# Patient Record
Sex: FEMALE | Race: AMERICAN INDIAN OR ALASKA NATIVE
[De-identification: names, ages, dates, MRNs, and addresses within clinical notes are randomized per-mention and may not be internally consistent; named-entity substitution may affect disease eponyms.]

---

## 2021-10-22 NOTE — EMERGENCY DEPARTMENT REPORT
HPI





- General


Chief Complaint: Allergic Reaction


Time Seen by Provider: 10/22/21 07:50





ED Past Medical Hx





- Past Medical History


Previous Medical History?: Yes


Hx Hypertension: Yes (2010)


Hx Seizures: Yes (LAST 3 YRS AGO;STOPPED TAKING DILANTIN)


Hx Asthma: Yes (has not used albuterol in 3 months)


Hx COPD: Yes


Hx HIV: Yes (1996)


Additional medical history: herniated disc x 3





- Surgical History


Past Surgical History?: Yes


Hx Breast Surgery: Yes (breast reduction)


Additional Surgical History: hysterectomy.  tonsilectomy





- Social History


Smoking Status: Former Smoker


Substance Use Type: None





- Medications


Home Medications: 


                                Home Medications











 Medication  Instructions  Recorded  Confirmed  Last Taken  Type


 


Amlodipine Besylate/Benazepril 10 mg PO BID 01/07/14 06/09/14 06/09/14 08:00 

History





[Lotrel 10-20 mg]     


 


Didanosine 250 mg PO DAILY 01/07/14 06/09/14 06/08/14 11:00 History


 


Efavirenz/Emtricit/Tenofovr Df 1 tab PO DAILY 01/07/14 06/09/14 06/08/14 11:00 

History





[Atripla Tablet]     


 


atenoloL [Tenormin] 50 mg PO DAILY 01/07/14 06/09/14 06/09/14 08:00 History


 


Albuterol Mdi (or & Nicu Only) 1 puff IH PRN PRN 06/09/14 06/09/14 04/01/14 

History





[ProAir HFA Inhaler]     


 


Ibuprofen [Motrin] 800 mg PO PRN PRN 06/09/14 06/09/14 05/20/14 History


 


Ciprofloxacin HCl [Ciprofloxacin 500 mg PO Q12HR #14 tab 06/25/20  Unknown Rx





TAB]     


 


Ondansetron [Zofran Odt] 4 mg PO Q8HR PRN #12 tab.rapdis 06/25/20  Unknown Rx














ED Review of Systems


ROS: 


Stated complaint: REACTION TO PHIZER BOOSTER SHOT


Other details as noted in HPI








Physical Exam





- Physical Exam


Vital Signs: 


                                   Vital Signs











  10/22/21





  07:33


 


Temperature 97.9 F


 


Pulse Rate 93 H


 


Respiratory 18





Rate 


 


Blood Pressure 173/115


 


O2 Sat by Pulse 96





Oximetry 














ED Course


                                   Vital Signs











  10/22/21





  07:33


 


Temperature 97.9 F


 


Pulse Rate 93 H


 


Respiratory 18





Rate 


 


Blood Pressure 173/115


 


O2 Sat by Pulse 96





Oximetry 











Critical care attestation.: 


If time is entered above; I have spent that time in minutes in the direct care 

of this critically ill patient, excluding procedure time.








ED Disposition


Condition: Stable

## 2021-10-22 NOTE — EMERGENCY DEPARTMENT REPORT
ED General Adult HPI





- General


Chief complaint: Allergic Reaction


Stated complaint: REACTION TO PHIZER BOOSTER SHOT


Time Seen by Provider: 10/22/21 07:50


Source: patient


Mode of arrival: Ambulatory


Limitations: No Limitations





- History of Present Illness


Initial comments: 





51-year-old -American female patient presents with complaints of rash and

itchiness to bilateral legs and arms x3 days.  Patient states her symptoms began

the day after receiving the Pfizer booster shot for COVID-19.  She denies any 

chest pain, shortness of breath, dysphagia, or other symptoms.  She reports she 

tried using Benadryl this morning that seemed to help a little with the itching.

 Past medical history includes hypertension and HIV.  Patient states she is 

compliant on ART and that she is undetectable.





- Related Data


                                Home Medications











 Medication  Instructions  Recorded  Confirmed  Last Taken


 


Amlodipine Besylate/Benazepril 10 mg PO BID 01/07/14 06/09/14 06/09/14 08:00





[Lotrel 10-20 mg]    


 


Didanosine 250 mg PO DAILY 01/07/14 06/09/14 06/08/14 11:00


 


Efavirenz/Emtricit/Tenofovr Df 1 tab PO DAILY 01/07/14 06/09/14 06/08/14 11:00





[Atripla Tablet]    


 


atenoloL [Tenormin] 50 mg PO DAILY 01/07/14 06/09/14 06/09/14 08:00


 


Albuterol Mdi (or & Nicu Only) 1 puff IH PRN PRN 06/09/14 06/09/14 04/01/14





[ProAir HFA Inhaler]    


 


Ibuprofen [Motrin] 800 mg PO PRN PRN 06/09/14 06/09/14 05/20/14








                                  Previous Rx's











 Medication  Instructions  Recorded  Last Taken  Type


 


Ciprofloxacin HCl [Ciprofloxacin 500 mg PO Q12HR #14 tab 06/25/20 Unknown Rx





TAB]    


 


Ondansetron [Zofran Odt] 4 mg PO Q8HR PRN #12 tab.rapdis 06/25/20 Unknown Rx


 


Famotidine [Pepcid] 20 mg PO BID 7 Days #14 tablet 10/22/21 Unknown Rx


 


Loratadine 10 mg PO QDAY #7 tablet 10/22/21 Unknown Rx


 


Triamcinolone Acetonide 430 gm TP TID PRN #1 oint...g. 10/22/21 Unknown Rx


 


predniSONE [Deltasone] 20 mg PO BID 6 Days #3 tab 10/22/21 Unknown Rx











                                    Allergies











Allergy/AdvReac Type Severity Reaction Status Date / Time


 


No Known Allergies Allergy   Verified 10/26/14 17:26














ED Review of Systems


ROS: 


Stated complaint: REACTION TO PHIZER BOOSTER SHOT


Other details as noted in HPI





Constitutional: denies: chills, fever, malaise


ENT: denies: throat pain


Respiratory: denies: cough, shortness of breath


Cardiovascular: denies: chest pain


Gastrointestinal: denies: nausea, vomiting


Skin: denies: change in color


Neurological: denies: headache, numbness, paresthesias





ED Past Medical Hx





- Past Medical History


Previous Medical History?: Yes


Hx Hypertension: Yes (2010)


Hx Seizures: Yes (LAST 3 YRS AGO;STOPPED TAKING DILANTIN)


Hx Asthma: Yes (has not used albuterol in 3 months)


Hx COPD: Yes


Hx HIV: Yes (1996)


Additional medical history: herniated disc x 3





- Surgical History


Past Surgical History?: Yes


Hx Breast Surgery: Yes (breast reduction)


Additional Surgical History: hysterectomy.  tonsilectomy





- Social History


Smoking Status: Former Smoker


Substance Use Type: None





- Medications


Home Medications: 


                                Home Medications











 Medication  Instructions  Recorded  Confirmed  Last Taken  Type


 


Amlodipine Besylate/Benazepril 10 mg PO BID 01/07/14 06/09/14 06/09/14 08:00 

History





[Lotrel 10-20 mg]     


 


Didanosine 250 mg PO DAILY 01/07/14 06/09/14 06/08/14 11:00 History


 


Efavirenz/Emtricit/Tenofovr Df 1 tab PO DAILY 01/07/14 06/09/14 06/08/14 11:00 

History





[Atripla Tablet]     


 


atenoloL [Tenormin] 50 mg PO DAILY 01/07/14 06/09/14 06/09/14 08:00 History


 


Albuterol Mdi (or & Nicu Only) 1 puff IH PRN PRN 06/09/14 06/09/14 04/01/14 

History





[ProAir HFA Inhaler]     


 


Ibuprofen [Motrin] 800 mg PO PRN PRN 06/09/14 06/09/14 05/20/14 History


 


Ciprofloxacin HCl [Ciprofloxacin 500 mg PO Q12HR #14 tab 06/25/20  Unknown Rx





TAB]     


 


Ondansetron [Zofran Odt] 4 mg PO Q8HR PRN #12 tab.rapdis 06/25/20  Unknown Rx


 


Famotidine [Pepcid] 20 mg PO BID 7 Days #14 tablet 10/22/21  Unknown Rx


 


Loratadine 10 mg PO QDAY #7 tablet 10/22/21  Unknown Rx


 


Triamcinolone Acetonide 430 gm TP TID PRN #1 oint...g. 10/22/21  Unknown Rx


 


predniSONE [Deltasone] 20 mg PO BID 6 Days #3 tab 10/22/21  Unknown Rx














ED Physical Exam





- General


Limitations: No Limitations


General appearance: alert, in no apparent distress, obese (Morbid)





- Head


Head exam: Present: atraumatic, normocephalic





- Eye


Eye exam: Present: normal appearance





- ENT


ENT exam: Present: normal exam





- Respiratory


Respiratory exam: Present: normal lung sounds bilaterally.  Absent: respiratory 

distress





- Cardiovascular


Cardiovascular Exam: Present: regular rate, normal rhythm





- Neurological Exam


Neurological exam: Present: alert, oriented X3, normal gait





- Psychiatric


Psychiatric exam: Present: normal affect, normal mood





- Skin


Skin exam: Present: warm, dry, intact, normal color, rash (Diffuse hive rash 

noted bilaterally to thighs and mildly to upper extremities; no cellulitic 

changes or drainage noted; no tenderness to palpation noted)





ED Course


                                   Vital Signs











  10/22/21 10/22/21





  07:33 08:44


 


Temperature 97.9 F 


 


Pulse Rate 93 H 


 


Respiratory 18 





Rate  


 


Blood Pressure 173/115 


 


Blood Pressure  176/111





[Right]  


 


O2 Sat by Pulse 96 





Oximetry  














ED Medical Decision Making





- Medical Decision Making








51-year-old -American female patient presents with complaints of rash and

 itchiness to bilateral legs and arms x3 days.  Patient states her symptoms 

began the day after receiving the Pfizer booster shot for COVID-19.  She denies 

any chest pain, shortness of breath, dysphagia, or other symptoms.  She reports 

she tried using Benadryl this morning that seemed to help a little with the 

itching.  Past medical history includes hypertension and HIV.  Patient states 

she is compliant on ART and that she is undetectable.





Hives noted diffusely to legs and arms on exam.  Will treat for allergic 

reaction with Claritin, Pepcid, and triamcinolone ointment.  Given patient is 

undetectable compliant on ART for HIV, a few days of prednisone also given.  

Blood pressure elevated, patient states she has not taken her blood pressure 

medication this morning and denies any neurologic symptoms.  Patient is 

otherwise well-appearing and stable for discharge home.  Strict return 

precautions were discussed in detail with patient who verbalizes understanding. 

 Patient is to follow-up with her PCP in 3 to 5 days


Critical care attestation.: 


If time is entered above; I have spent that time in minutes in the direct care 

of this critically ill patient, excluding procedure time.








ED Disposition


Clinical Impression: 


 Hives





Disposition: 01 HOME / SELF CARE / HOMELESS


Is pt being admited?: No


Condition: Stable


Instructions:  Hives


Prescriptions: 


predniSONE [Deltasone] 20 mg PO BID 6 Days #3 tab


Loratadine 10 mg PO QDAY #7 tablet


Famotidine [Pepcid] 20 mg PO BID 7 Days #14 tablet


Triamcinolone Acetonide 430 gm TP TID PRN #1 oint...g.


 PRN Reason: itching 


Referrals: 


PRIMARY CARE,MD [Referring] - 3-5 Days

## 2022-02-18 ENCOUNTER — HOSPITAL ENCOUNTER (EMERGENCY)
Dept: HOSPITAL 5 - ED | Age: 52
Discharge: HOME | End: 2022-02-18
Payer: MEDICAID

## 2022-02-18 VITALS — DIASTOLIC BLOOD PRESSURE: 94 MMHG | SYSTOLIC BLOOD PRESSURE: 159 MMHG

## 2022-02-18 DIAGNOSIS — T78.40XA: Primary | ICD-10-CM

## 2022-02-18 DIAGNOSIS — I10: ICD-10-CM
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
## 2022-02-18 DIAGNOSIS — Z79.899: ICD-10-CM

## 2022-02-18 DIAGNOSIS — Z87.891: ICD-10-CM

## 2022-02-18 DIAGNOSIS — Z90.89: ICD-10-CM

## 2022-02-18 DIAGNOSIS — J45.909: ICD-10-CM

## 2022-02-18 DIAGNOSIS — Z90.49: ICD-10-CM

## 2022-02-18 PROCEDURE — 99282 EMERGENCY DEPT VISIT SF MDM: CPT

## 2022-02-18 NOTE — EMERGENCY DEPARTMENT REPORT
ED General Adult HPI





- General


Chief complaint: Skin Rash


Stated complaint: ALLERGIC REACTION HEARTBURN


Time Seen by Provider: 02/18/22 03:02


Source: patient


Mode of arrival: Ambulatory


Limitations: No Limitations





- History of Present Illness


Initial comments: 


Patient 52-year-old female who presents for rash x2 days.  States she took 

Benadryl and prednisone prescribed by her pain doctor today symptoms are 

resolved at this time states she is waited long enough for the rash to go high 

at this point.  Rash was described as raised smooth red pruritus to the trunk 

and back.  There is no open skin no drainage no fevers no chills no nausea 

vomiting no chest pain no shortness of breath.  No palpitations.  Patient 

appears well and nontoxic at this time.  There is no wheezing no stridor.  No 

hives.  Patient has history of obesity and hypertension.





Severity scale (0 -10): 0





- Related Data


                                Home Medications











 Medication  Instructions  Recorded  Confirmed  Last Taken


 


Amlodipine Besylate/Benazepril 10 mg PO BID 01/07/14 06/09/14 06/09/14 08:00





[Lotrel 10-20 mg]    


 


Didanosine 250 mg PO DAILY 01/07/14 06/09/14 06/08/14 11:00


 


Efavirenz/Emtricit/Tenofovr Df 1 tab PO DAILY 01/07/14 06/09/14 06/08/14 11:00





[Atripla Tablet]    


 


atenoloL [Tenormin] 50 mg PO DAILY 01/07/14 06/09/14 06/09/14 08:00


 


Albuterol Mdi (or & Nicu Only) 1 puff IH PRN PRN 06/09/14 06/09/14 04/01/14





[ProAir HFA Inhaler]    


 


Ibuprofen [Motrin] 800 mg PO PRN PRN 06/09/14 06/09/14 05/20/14








                                  Previous Rx's











 Medication  Instructions  Recorded  Last Taken  Type


 


Ciprofloxacin HCl [Ciprofloxacin 500 mg PO Q12HR #14 tab 06/25/20 Unknown Rx





TAB]    


 


Ondansetron [Zofran Odt] 4 mg PO Q8HR PRN #12 tab.rapdis 06/25/20 Unknown Rx


 


Famotidine [Pepcid] 20 mg PO BID 7 Days #14 tablet 10/22/21 Unknown Rx


 


Loratadine 10 mg PO QDAY #7 tablet 10/22/21 Unknown Rx


 


Triamcinolone Acetonide 430 gm TP TID PRN #1 oint...g. 10/22/21 Unknown Rx


 


predniSONE [Deltasone] 20 mg PO BID 6 Days #3 tab 10/22/21 Unknown Rx


 


Metoclopramide [Reglan] 10 mg PO TID PRN #30 tab 02/18/22 Unknown Rx


 


diphenhydrAMINE [Benadryl CAP] 25 mg PO Q8HR PRN #30 capsule 02/18/22 Unknown Rx


 


predniSONE [Deltasone] 40 mg PO DAILY #5 02/18/22 Unknown Rx











                                    Allergies











Allergy/AdvReac Type Severity Reaction Status Date / Time


 


No Known Allergies Allergy   Verified 10/26/14 17:26














ED Review of Systems


ROS: 


Stated complaint: ALLERGIC REACTION HEARTBURN


Other details as noted in HPI





Constitutional: denies: chills, fever


Eyes: denies: eye pain, eye discharge, vision change


ENT: denies: ear pain, throat pain


Respiratory: denies: cough, shortness of breath, wheezing


Cardiovascular: denies: chest pain, palpitations


Endocrine: no symptoms reported


Gastrointestinal: denies: abdominal pain, nausea, diarrhea


Genitourinary: denies: urgency, dysuria, discharge


Musculoskeletal: denies: back pain, joint swelling, arthralgia


Skin: rash (Back and trunk , legs and arms)


Neurological: denies: headache, weakness, paresthesias


Psychiatric: denies: anxiety, depression


Hematological/Lymphatic: denies: easy bleeding, easy bruising





ED Past Medical Hx





- Past Medical History


Previous Medical History?: Yes


Hx Hypertension: Yes (2010)


Hx Seizures: Yes (LAST 3 YRS AGO;STOPPED TAKING DILANTIN)


Hx Asthma: Yes (has not used albuterol in 3 months)


Hx COPD: Yes


Hx HIV: Yes (1996)


Additional medical history: herniated disc x 3





- Surgical History


Past Surgical History?: Yes


Hx Breast Surgery: Yes (breast reduction)


Additional Surgical History: hysterectomy.  tonsilectomy





- Social History


Smoking Status: Former Smoker


Substance Use Type: None





- Medications


Home Medications: 


                                Home Medications











 Medication  Instructions  Recorded  Confirmed  Last Taken  Type


 


Amlodipine Besylate/Benazepril 10 mg PO BID 01/07/14 06/09/14 06/09/14 08:00 

History





[Lotrel 10-20 mg]     


 


Didanosine 250 mg PO DAILY 01/07/14 06/09/14 06/08/14 11:00 History


 


Efavirenz/Emtricit/Tenofovr Df 1 tab PO DAILY 01/07/14 06/09/14 06/08/14 11:00 

History





[Atripla Tablet]     


 


atenoloL [Tenormin] 50 mg PO DAILY 01/07/14 06/09/14 06/09/14 08:00 History


 


Albuterol Mdi (or & Nicu Only) 1 puff IH PRN PRN 06/09/14 06/09/14 04/01/14 

History





[ProAir HFA Inhaler]     


 


Ibuprofen [Motrin] 800 mg PO PRN PRN 06/09/14 06/09/14 05/20/14 History


 


Ciprofloxacin HCl [Ciprofloxacin 500 mg PO Q12HR #14 tab 06/25/20  Unknown Rx





TAB]     


 


Ondansetron [Zofran Odt] 4 mg PO Q8HR PRN #12 tab.rapdis 06/25/20  Unknown Rx


 


Famotidine [Pepcid] 20 mg PO BID 7 Days #14 tablet 10/22/21  Unknown Rx


 


Loratadine 10 mg PO QDAY #7 tablet 10/22/21  Unknown Rx


 


Triamcinolone Acetonide 430 gm TP TID PRN #1 oint...g. 10/22/21  Unknown Rx


 


predniSONE [Deltasone] 20 mg PO BID 6 Days #3 tab 10/22/21  Unknown Rx


 


Metoclopramide [Reglan] 10 mg PO TID PRN #30 tab 02/18/22  Unknown Rx


 


diphenhydrAMINE [Benadryl CAP] 25 mg PO Q8HR PRN #30 capsule 02/18/22  Unknown 

Rx


 


predniSONE [Deltasone] 40 mg PO DAILY #5 02/18/22  Unknown Rx














ED Physical Exam





- General


Limitations: No Limitations


General appearance: alert, in no apparent distress





- Head


Head exam: Present: normocephalic, normal inspection





- Eye


Eye exam: Present: normal appearance, PERRL, EOMI


Pupils: Present: normal accommodation





- ENT


ENT exam: Present: normal orophraynx, mucous membranes moist





- Expanded ENT Exam


  ** Expanded


Throat exam: Positive: other (Airways patent no lesions no exudate uvula midline

 no stridor no wheezing).  Negative: tonsillar erythema, tonsillomegaly, 

tonsillar exudate





- Neck


Neck exam: Present: normal inspection, full ROM.  Absent: tenderness, 

lymphadenopathy, thyromegaly





- Expanded Neck Exam


  ** Expanded


Neck exam: Absent: midline deformity, anterior neck swelling, thyroid mass, 

carotid bruit, tracheal deviation





- Respiratory


Respiratory exam: Present: normal lung sounds bilaterally.  Absent: respiratory 

distress, wheezes, stridor, chest wall tenderness





- Cardiovascular


Cardiovascular Exam: Present: regular rate, normal rhythm, normal heart sounds





- GI/Abdominal


GI/Abdominal exam: Present: soft, normal bowel sounds.  Absent: distended, 

tenderness





- Rectal


Rectal exam: Present: deferred





- Extremities Exam


Extremities exam: Present: normal inspection, full ROM, normal capillary refill.

  Absent: tenderness





- Back Exam


Back exam: Present: normal inspection, full ROM, rash noted (Mild swelling and 

erythema pruritus no drainage no fevers).  Absent: tenderness





- Neurological Exam


Neurological exam: Present: alert, oriented X3, CN II-XII intact, normal gait, 

reflexes normal





- Expanded Neurological Exam


  ** Expanded


Patient oriented to: Present: person, place, time


Speech: Present: fluid speech


Cranial nerves: EOM's Intact: Normal, Gag Reflex: Normal, Tongue Deviation: 

Normal


Motor strength exam: RUE: 5, LUE: 5, RLE: 5, LLE: 5


Best Eye Response ([place]): (4) open spontaneously


Best Motor Response ([place]): (6) obeys commands


Best Verbal Response ([place]): (5) oriented


[place] Total: 15





- Psychiatric


Psychiatric exam: Present: normal affect, normal mood





- Skin


Skin exam: Present: warm, dry, intact





ED Course





                                   Vital Signs











  02/18/22





  02:26


 


Temperature 99.0 F


 


Pulse Rate 77


 


Respiratory 20





Rate 


 


Blood Pressure 159/94





[Right] 


 


O2 Sat by Pulse 95





Oximetry 














ED Medical Decision Making





- Medical Decision Making


There is a straightforward allergic reaction that is resolved.  Plan DC to home 

with prescriptions.  Take medications as prescribed, follow-up with your doctor 

in 2 to 3 days.  Patient verbalized agreement and understanding with discharge 

plan.  Patient DC'd home at this time patient is alert oriented x3 patient is 

amatory with steady gait there is no chest pain no shortness of breath no 

dizziness no lightheadedness there has been no nausea no vomiting.  Patient is 

in stable condition at this time.





Critical care attestation.: 


If time is entered above; I have spent that time in minutes in the direct care 

of this critically ill patient, excluding procedure time.








ED Disposition


Clinical Impression: 


Allergic reaction


Qualifiers:


 Encounter type: initial encounter Qualified Code(s): T78.40XA - Allergy, 

unspecified, initial encounter





Disposition: 01 HOME / SELF CARE / HOMELESS


Is pt being admited?: No


Does the pt Need Aspirin: No


Condition: Stable


Instructions:  Allergies, Adult, Easy-to-Read


Additional Instructions: 


Take medications as prescribed, follow-up with your doctor in 2 to 3 days.  

Return to emergency department should symptoms worsen.


Prescriptions: 


diphenhydrAMINE [Benadryl CAP] 25 mg PO Q8HR PRN #30 capsule


 PRN Reason: allergy symptoms 


predniSONE [Deltasone] 40 mg PO DAILY #5


Metoclopramide [Reglan] 10 mg PO TID PRN #30 tab


 PRN Reason: Allergy Symptoms


Referrals: 


YASMANY VENTURA MD [Staff Physician] - 3-5 Days


Forms:  Work/School Release Form(ED)


Time of Disposition: 04:07

## 2022-06-12 ENCOUNTER — HOSPITAL ENCOUNTER (EMERGENCY)
Dept: HOSPITAL 5 - ED | Age: 52
LOS: 1 days | Discharge: LEFT BEFORE DISCHARGE | End: 2022-06-13
Payer: MEDICAID

## 2022-06-12 VITALS — DIASTOLIC BLOOD PRESSURE: 115 MMHG | SYSTOLIC BLOOD PRESSURE: 212 MMHG
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
## 2022-06-12 DIAGNOSIS — Z53.21: ICD-10-CM

## 2022-06-12 DIAGNOSIS — M79.644: Primary | ICD-10-CM
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
## 2022-06-12 NOTE — XRAY REPORT
RIGHT HAND 3 VIEW(S)



INDICATION / CLINICAL INFORMATION: INJURY with right hand pain.



COMPARISON: None available.

 

FINDINGS:



BONES / JOINT(S): Right index finger PIP joint dislocation with the middle phalanx dorsally dislocate
d relative to the proximal phalanx. No acute fracture. No significant arthritis.

SOFT TISSUES: The distal portion of the proximal phalanx of the index finger possibly protrudes throu
gh the skin as seen on the lateral view. Small amount of soft tissue gas.



ADDITIONAL FINDINGS: None.



IMPRESSION:

1. Right index finger PIP joint dislocation, possibly open.



Signer Name: VÍCTOR Orosco MD 

Signed: 6/12/2022 8:47 PM

Workstation Name: [id number]
